# Patient Record
Sex: FEMALE | Race: OTHER
[De-identification: names, ages, dates, MRNs, and addresses within clinical notes are randomized per-mention and may not be internally consistent; named-entity substitution may affect disease eponyms.]

---

## 2019-08-30 ENCOUNTER — LABORATORY RESULT (OUTPATIENT)
Age: 29
End: 2019-08-30

## 2019-08-30 ENCOUNTER — APPOINTMENT (OUTPATIENT)
Dept: ENDOCRINOLOGY | Facility: CLINIC | Age: 29
End: 2019-08-30
Payer: MEDICAID

## 2019-08-30 VITALS
HEIGHT: 66 IN | BODY MASS INDEX: 35.2 KG/M2 | SYSTOLIC BLOOD PRESSURE: 98 MMHG | DIASTOLIC BLOOD PRESSURE: 62 MMHG | HEART RATE: 72 BPM | WEIGHT: 219 LBS

## 2019-08-30 DIAGNOSIS — E16.2 HYPOGLYCEMIA, UNSPECIFIED: ICD-10-CM

## 2019-08-30 DIAGNOSIS — N91.2 AMENORRHEA, UNSPECIFIED: ICD-10-CM

## 2019-08-30 PROBLEM — Z00.00 ENCOUNTER FOR PREVENTIVE HEALTH EXAMINATION: Status: ACTIVE | Noted: 2019-08-30

## 2019-08-30 PROCEDURE — 99204 OFFICE O/P NEW MOD 45 MIN: CPT | Mod: 25

## 2019-08-30 PROCEDURE — 36415 COLL VENOUS BLD VENIPUNCTURE: CPT

## 2019-08-30 RX ORDER — GLUCAGON 1 MG
1 KIT INJECTION
Qty: 3 | Refills: 3 | Status: ACTIVE | COMMUNITY
Start: 2019-08-30 | End: 1900-01-01

## 2019-09-02 PROBLEM — N91.2 AMENORRHEA: Status: ACTIVE | Noted: 2019-08-30

## 2019-09-02 PROBLEM — E16.2 HYPOGLYCEMIA: Status: ACTIVE | Noted: 2019-08-30

## 2019-09-02 RX ORDER — METFORMIN ER 500 MG 500 MG/1
500 TABLET ORAL
Refills: 0 | Status: ACTIVE | COMMUNITY

## 2019-09-02 NOTE — PHYSICAL EXAM
[Alert] : alert [No Acute Distress] : no acute distress [PERRL] : pupils equal, round and reactive to light [No Proptosis] : no proptosis [Normal Outer Ear/Nose] : the ears and nose were normal in appearance [Supple] : the neck was supple [Thyroid Not Enlarged] : the thyroid was not enlarged [No Respiratory Distress] : no respiratory distress [No Accessory Muscle Use] : no accessory muscle use [Normal Rate and Effort] : normal respiratory rhythm and effort [Normal Rate] : heart rate was normal  [Regular Rhythm] : with a regular rhythm [No Stigmata of Cushings Syndrome] : no stigmata of cushings syndrome [Oriented x3] : oriented to person, place, and time [Normal Affect] : the affect was normal [Normal Insight/Judgement] : insight and judgment were intact [Normal Mood] : the mood was normal

## 2019-09-02 NOTE — HISTORY OF PRESENT ILLNESS
[FreeTextEntry1] : August 30, 2019\par \par \par PCP:   Dr. Miguel Garcia in [place]  p \par           Gyn:  Dr. Subramanian  in [facility]  [phone]\par           Endocrine:   Dr. 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  Mother RN at [employer].  \par Starting at nine months old, developed hypoglycemia.   Treated at [facility] with glucagon from about 9 months until age 13.  \par Mother would give her daily glucagon.  \par \par Seizures stopped as a child.   Stopped glucagon age 13.  Stopped testing sugars age 13.  \par No further sugar issues until about age [age] (age 22).  Saw endocrinologist in [place].  BS was 22.   Started \par testing her own sugars since 23.   Recently down to 42 mg/dl.   Had an ERCP\par \par 2014 GYN put her on metformin for irregular menstural cycles, increased hair growth, elevated testosterone.  \par Used to take metformin 1000 mg BID.  On 1500 mg daily, got menstrual cycle every month.   Referred to Dr. Tripathi.\par a few months ago.   Did ON dex suppression test - normal, and she continued the 1500 mg of metformin.\par Now getting hypoglycemic blackouts.  \par \par Impression:  Reason for hypoglycemia not clear. \par \par Plan:  Updated labs today.\par Low carbohydrate diet, especially breakfast, to take reactive hypoglycemia into account.    \par

## 2019-09-02 NOTE — ASSESSMENT
[FreeTextEntry1] : &\par Evaluation by Dr. Tripathi shows hormonal profile common with PCOS.\par Reason for marked hypoglycemia not clear.\par Will start by addressing possible reactive hypoglycemia.

## 2019-09-08 ENCOUNTER — LABORATORY RESULT (OUTPATIENT)
Age: 29
End: 2019-09-08

## 2019-09-09 ENCOUNTER — LABORATORY RESULT (OUTPATIENT)
Age: 29
End: 2019-09-09

## 2019-10-17 ENCOUNTER — APPOINTMENT (OUTPATIENT)
Dept: ENDOCRINOLOGY | Facility: CLINIC | Age: 29
End: 2019-10-17

## 2019-11-02 ENCOUNTER — RX RENEWAL (OUTPATIENT)
Age: 29
End: 2019-11-02

## 2019-11-02 RX ORDER — BLOOD SUGAR DIAGNOSTIC
STRIP MISCELLANEOUS 3 TIMES DAILY
Qty: 100 | Refills: 11 | Status: ACTIVE | COMMUNITY
Start: 2019-11-02 | End: 1900-01-01

## 2020-09-08 ENCOUNTER — TRANSCRIPTION ENCOUNTER (OUTPATIENT)
Age: 30
End: 2020-09-08

## 2020-11-09 ENCOUNTER — TRANSCRIPTION ENCOUNTER (OUTPATIENT)
Age: 30
End: 2020-11-09

## 2021-06-17 ENCOUNTER — TRANSCRIPTION ENCOUNTER (OUTPATIENT)
Age: 31
End: 2021-06-17

## 2021-10-01 ENCOUNTER — TRANSCRIPTION ENCOUNTER (OUTPATIENT)
Age: 31
End: 2021-10-01

## 2021-11-09 ENCOUNTER — TRANSCRIPTION ENCOUNTER (OUTPATIENT)
Age: 31
End: 2021-11-09

## 2021-12-23 ENCOUNTER — TRANSCRIPTION ENCOUNTER (OUTPATIENT)
Age: 31
End: 2021-12-23

## 2022-01-23 ENCOUNTER — TRANSCRIPTION ENCOUNTER (OUTPATIENT)
Age: 32
End: 2022-01-23

## 2022-09-04 ENCOUNTER — NON-APPOINTMENT (OUTPATIENT)
Age: 32
End: 2022-09-04

## 2022-12-27 ENCOUNTER — NON-APPOINTMENT (OUTPATIENT)
Age: 32
End: 2022-12-27

## 2023-01-30 ENCOUNTER — NON-APPOINTMENT (OUTPATIENT)
Age: 33
End: 2023-01-30